# Patient Record
Sex: MALE | Race: WHITE | Employment: OTHER | URBAN - METROPOLITAN AREA
[De-identification: names, ages, dates, MRNs, and addresses within clinical notes are randomized per-mention and may not be internally consistent; named-entity substitution may affect disease eponyms.]

---

## 2021-12-30 ENCOUNTER — APPOINTMENT (OUTPATIENT)
Dept: GENERAL RADIOLOGY | Age: 36
End: 2021-12-30

## 2021-12-30 ENCOUNTER — HOSPITAL ENCOUNTER (EMERGENCY)
Age: 36
Discharge: HOME | End: 2021-12-30

## 2021-12-30 VITALS
HEIGHT: 71 IN | OXYGEN SATURATION: 97 % | HEART RATE: 103 BPM | DIASTOLIC BLOOD PRESSURE: 99 MMHG | BODY MASS INDEX: 27.58 KG/M2 | RESPIRATION RATE: 15 BRPM | TEMPERATURE: 97.8 F | WEIGHT: 197 LBS | SYSTOLIC BLOOD PRESSURE: 152 MMHG

## 2021-12-30 DIAGNOSIS — S39.012A STRAIN OF LUMBAR REGION, INITIAL ENCOUNTER: Primary | ICD-10-CM

## 2021-12-30 PROCEDURE — 72100 X-RAY EXAM L-S SPINE 2/3 VWS: CPT

## 2021-12-30 PROCEDURE — 6370000000 HC RX 637 (ALT 250 FOR IP): Performed by: PHYSICIAN ASSISTANT

## 2021-12-30 PROCEDURE — 99283 EMERGENCY DEPT VISIT LOW MDM: CPT

## 2021-12-30 PROCEDURE — 6360000002 HC RX W HCPCS: Performed by: PHYSICIAN ASSISTANT

## 2021-12-30 PROCEDURE — 96372 THER/PROPH/DIAG INJ SC/IM: CPT

## 2021-12-30 RX ORDER — LIDOCAINE 50 MG/G
1 PATCH TOPICAL DAILY
Qty: 10 PATCH | Refills: 0 | Status: SHIPPED | OUTPATIENT
Start: 2021-12-30 | End: 2022-01-09

## 2021-12-30 RX ORDER — HYDROCODONE BITARTRATE AND ACETAMINOPHEN 5; 325 MG/1; MG/1
1 TABLET ORAL ONCE
Status: COMPLETED | OUTPATIENT
Start: 2021-12-30 | End: 2021-12-30

## 2021-12-30 RX ORDER — METHOCARBAMOL 500 MG/1
500 TABLET, FILM COATED ORAL 4 TIMES DAILY PRN
Qty: 30 TABLET | Refills: 0 | Status: SHIPPED | OUTPATIENT
Start: 2021-12-30 | End: 2022-01-09

## 2021-12-30 RX ORDER — CYCLOBENZAPRINE HCL 10 MG
10 TABLET ORAL ONCE
Status: COMPLETED | OUTPATIENT
Start: 2021-12-30 | End: 2021-12-30

## 2021-12-30 RX ORDER — KETOROLAC TROMETHAMINE 10 MG/1
10 TABLET, FILM COATED ORAL EVERY 6 HOURS PRN
Qty: 20 TABLET | Refills: 0 | Status: SHIPPED | OUTPATIENT
Start: 2021-12-30

## 2021-12-30 RX ORDER — DEXTROAMPHETAMINE SACCHARATE, AMPHETAMINE ASPARTATE, DEXTROAMPHETAMINE SULFATE AND AMPHETAMINE SULFATE 5; 5; 5; 5 MG/1; MG/1; MG/1; MG/1
20 TABLET ORAL 2 TIMES DAILY
COMMUNITY

## 2021-12-30 RX ORDER — HYDROCODONE BITARTRATE AND ACETAMINOPHEN 5; 325 MG/1; MG/1
1 TABLET ORAL EVERY 6 HOURS PRN
Qty: 12 TABLET | Refills: 0 | Status: SHIPPED | OUTPATIENT
Start: 2021-12-30 | End: 2022-01-02

## 2021-12-30 RX ORDER — KETOROLAC TROMETHAMINE 30 MG/ML
30 INJECTION, SOLUTION INTRAMUSCULAR; INTRAVENOUS ONCE
Status: COMPLETED | OUTPATIENT
Start: 2021-12-30 | End: 2021-12-30

## 2021-12-30 RX ADMIN — CYCLOBENZAPRINE 10 MG: 10 TABLET, FILM COATED ORAL at 08:17

## 2021-12-30 RX ADMIN — HYDROCODONE BITARTRATE AND ACETAMINOPHEN 1 TABLET: 5; 325 TABLET ORAL at 08:17

## 2021-12-30 RX ADMIN — KETOROLAC TROMETHAMINE 30 MG: 30 INJECTION, SOLUTION INTRAMUSCULAR; INTRAVENOUS at 08:17

## 2021-12-30 ASSESSMENT — PAIN SCALES - GENERAL
PAINLEVEL_OUTOF10: 10
PAINLEVEL_OUTOF10: 8

## 2021-12-30 ASSESSMENT — PAIN DESCRIPTION - LOCATION: LOCATION: BACK

## 2021-12-30 ASSESSMENT — PAIN DESCRIPTION - ORIENTATION: ORIENTATION: RIGHT;LEFT;MID

## 2021-12-30 ASSESSMENT — PAIN DESCRIPTION - ONSET: ONSET: AWAKENED FROM SLEEP

## 2021-12-30 ASSESSMENT — PAIN DESCRIPTION - PAIN TYPE: TYPE: ACUTE PAIN

## 2021-12-30 ASSESSMENT — PAIN DESCRIPTION - FREQUENCY: FREQUENCY: CONTINUOUS

## 2021-12-30 ASSESSMENT — PAIN DESCRIPTION - DESCRIPTORS: DESCRIPTORS: ACHING;SHARP;THROBBING

## 2021-12-30 NOTE — ED PROVIDER NOTES
[facility] Emergency Medicine   ED  Provider Note  Admit Date/RoomTime: 12/30/2021  7:40 AM  ED Room: [place] [address]      8:08 AM EST      HPI: Miky Melendez 36 y.o.male with No past medical history on file. who presents with right sided lower back pain. The patient states that the back pain began 2 days ago but worsened this morning upon awakneing. The history is obtained from the patient. The mechanism of the injury is apparent. The patient states that the pain is moderate. It is worsened by movement including flexion and extension of the back as well as rotation. Patient denies any distal neurovascular complaints including numbness tingling or weakness. There are no bowel or bladder symptoms reported. No incontinence and no urinary retention. The patient denies saddle or groin anesthesia. The patient also denies fevers, chills, weight loss, night sweats, IV drug use, or recent illness. Review of Systems:   Pertinent positives and negatives are stated within HPI, all other systems reviewed and are negative.      --------------------------------------------- PAST HISTORY ---------------------------------------------  Past Medical History:  has no past medical history on file. Past Surgical History:  has no past surgical history on file. Social History:  reports that he has been smoking cigarettes. He has been smoking about 1.00 pack per day. He has never used smokeless tobacco. He reports previous alcohol use. Family History: family history is not on file. The patients home medications have been reviewed. Allergies: Patient has no known allergies.              Nursing Notes Reviewed by myself  Vitals Signs Reviewed by myself  BP (!) 152/99   Pulse 103   Temp 97.8 °F (36.6 °C) (Oral)   Resp 15   Ht 5' 11\" (1.803 m)   Wt 197 lb (89.4 kg)   SpO2 97%   BMI 27.48 kg/m²         Physical exam:  Constitutional:  The patient is comfortable, well appearing, non toxic in NAD. Patient is alert and oriented x3. Head: Head is atraumatic and normocephalic. Eyes: There is no discharge from the eyes. Sclerae are normal.  ENT: The oropharynx is normal. The mouth is normal to inspection. Neck: Normal range of motion of the neck is present there is no JVD present no meningeal signs are present. Respiratory/chest: The chest is nontender breath sounds are normal  Cardiovascular: Regular rate and rhythm is noted. No murmurs. No rubs or gallops. Skin: Skin is warm and dry, Skin exam normal  Neurologic exam: The patient's Grant Coma Scale is 15. No focal motor deficits. There are no focal sensory deficits. Deep tendon reflexes are intact and present bilaterally in the lower extremities. Babinski is absent. Gait is normal. Symmetric strength and sensation in the lower extremities bilaterally. Back exam: The patient has reproducible tenderness to palpation in the paravertebral lumbar region on the right. There is no evidence of localized erythema nor is there any focal warmth to the back. The patient has no evidence of single vertebral tenderness or any single area of interspace tenderness. There are no palpable deformities, lacerations, abrasions, or stepoffs. No midline cervical, thoracic, or lumbar spine tenderness. No CVA tenderness to palpation. Medical decision making:   Mechanical lumbosacral strain without evidence of fracture or neurologic compromise.      Plan:  Review of past medical records for appropriate pain control and outpatient referral.               Impression:  Lumbosacral Strain    Disposition:  Discharge    Condition:  Stable        DIYA Kenny  12/30/21 1050